# Patient Record
Sex: FEMALE | Race: WHITE | NOT HISPANIC OR LATINO
[De-identification: names, ages, dates, MRNs, and addresses within clinical notes are randomized per-mention and may not be internally consistent; named-entity substitution may affect disease eponyms.]

---

## 2017-10-26 ENCOUNTER — TRANSCRIPTION ENCOUNTER (OUTPATIENT)
Age: 47
End: 2017-10-26

## 2017-10-26 PROBLEM — Z00.00 ENCOUNTER FOR PREVENTIVE HEALTH EXAMINATION: Status: ACTIVE | Noted: 2017-10-26

## 2017-11-07 ENCOUNTER — APPOINTMENT (OUTPATIENT)
Dept: PLASTIC SURGERY | Facility: CLINIC | Age: 47
End: 2017-11-07
Payer: COMMERCIAL

## 2017-11-07 VITALS — HEIGHT: 64 IN | WEIGHT: 157 LBS | BODY MASS INDEX: 26.8 KG/M2

## 2017-11-07 DIAGNOSIS — Z82.49 FAMILY HISTORY OF ISCHEMIC HEART DISEASE AND OTHER DISEASES OF THE CIRCULATORY SYSTEM: ICD-10-CM

## 2017-11-07 DIAGNOSIS — Z83.3 FAMILY HISTORY OF DIABETES MELLITUS: ICD-10-CM

## 2017-11-07 PROCEDURE — 99203 OFFICE O/P NEW LOW 30 MIN: CPT

## 2017-11-07 RX ORDER — LEVOTHYROXINE SODIUM 0.12 MG/1
125 TABLET ORAL DAILY
Refills: 0 | Status: ACTIVE | COMMUNITY

## 2017-11-07 RX ORDER — ATORVASTATIN CALCIUM 80 MG/1
TABLET, FILM COATED ORAL
Refills: 0 | Status: ACTIVE | COMMUNITY

## 2018-02-02 ENCOUNTER — APPOINTMENT (OUTPATIENT)
Dept: PLASTIC SURGERY | Facility: CLINIC | Age: 48
End: 2018-02-02
Payer: COMMERCIAL

## 2018-02-02 PROCEDURE — 11401 EXC TR-EXT B9+MARG 0.6-1 CM: CPT

## 2018-02-02 PROCEDURE — 13120 CMPLX RPR S/A/L 1.1-2.5 CM: CPT | Mod: 59

## 2018-02-14 ENCOUNTER — APPOINTMENT (OUTPATIENT)
Dept: PLASTIC SURGERY | Facility: CLINIC | Age: 48
End: 2018-02-14
Payer: COMMERCIAL

## 2018-02-14 DIAGNOSIS — L98.9 DISORDER OF THE SKIN AND SUBCUTANEOUS TISSUE, UNSPECIFIED: ICD-10-CM

## 2018-02-14 PROCEDURE — 99024 POSTOP FOLLOW-UP VISIT: CPT

## 2018-03-09 ENCOUNTER — EMERGENCY (EMERGENCY)
Facility: HOSPITAL | Age: 48
LOS: 0 days | Discharge: HOME | End: 2018-03-09
Attending: EMERGENCY MEDICINE

## 2018-03-09 VITALS
RESPIRATION RATE: 18 BRPM | TEMPERATURE: 98 F | HEART RATE: 75 BPM | OXYGEN SATURATION: 98 % | DIASTOLIC BLOOD PRESSURE: 65 MMHG | SYSTOLIC BLOOD PRESSURE: 131 MMHG

## 2018-03-09 DIAGNOSIS — Z79.899 OTHER LONG TERM (CURRENT) DRUG THERAPY: ICD-10-CM

## 2018-03-09 DIAGNOSIS — E03.9 HYPOTHYROIDISM, UNSPECIFIED: ICD-10-CM

## 2018-03-09 DIAGNOSIS — R00.2 PALPITATIONS: ICD-10-CM

## 2018-03-09 DIAGNOSIS — E78.00 PURE HYPERCHOLESTEROLEMIA, UNSPECIFIED: ICD-10-CM

## 2018-03-09 DIAGNOSIS — R07.89 OTHER CHEST PAIN: ICD-10-CM

## 2018-03-09 LAB
ALBUMIN SERPL ELPH-MCNC: 4.9 G/DL — SIGNIFICANT CHANGE UP (ref 3–5.5)
ALP SERPL-CCNC: 41 U/L — SIGNIFICANT CHANGE UP (ref 30–115)
ALT FLD-CCNC: 13 U/L — SIGNIFICANT CHANGE UP (ref 0–41)
ANION GAP SERPL CALC-SCNC: 9 MMOL/L — SIGNIFICANT CHANGE UP (ref 7–14)
AST SERPL-CCNC: 22 U/L — SIGNIFICANT CHANGE UP (ref 0–41)
BASOPHILS # BLD AUTO: 0.03 K/UL — SIGNIFICANT CHANGE UP (ref 0–0.2)
BASOPHILS NFR BLD AUTO: 0.3 % — SIGNIFICANT CHANGE UP (ref 0–1)
BILIRUB SERPL-MCNC: 0.9 MG/DL — SIGNIFICANT CHANGE UP (ref 0.2–1.2)
BUN SERPL-MCNC: 10 MG/DL — SIGNIFICANT CHANGE UP (ref 10–20)
CALCIUM SERPL-MCNC: 10.2 MG/DL — HIGH (ref 8.5–10.1)
CHLORIDE SERPL-SCNC: 103 MMOL/L — SIGNIFICANT CHANGE UP (ref 98–110)
CK MB BLD-MCNC: 2 % — SIGNIFICANT CHANGE UP (ref 0–4)
CK MB CFR SERPL CALC: 1 NG/ML — SIGNIFICANT CHANGE UP (ref 0.6–6.3)
CK SERPL-CCNC: 59 U/L — SIGNIFICANT CHANGE UP (ref 0–225)
CO2 SERPL-SCNC: 26 MMOL/L — SIGNIFICANT CHANGE UP (ref 17–32)
CREAT SERPL-MCNC: 0.8 MG/DL — SIGNIFICANT CHANGE UP (ref 0.7–1.5)
D DIMER BLD IA.RAPID-MCNC: 120 NG/ML DDU — SIGNIFICANT CHANGE UP (ref 0–230)
EOSINOPHIL # BLD AUTO: 0.02 K/UL — SIGNIFICANT CHANGE UP (ref 0–0.7)
EOSINOPHIL NFR BLD AUTO: 0.2 % — SIGNIFICANT CHANGE UP (ref 0–8)
GLUCOSE SERPL-MCNC: 82 MG/DL — SIGNIFICANT CHANGE UP (ref 70–110)
HCT VFR BLD CALC: 42 % — SIGNIFICANT CHANGE UP (ref 37–47)
HGB BLD-MCNC: 14.4 G/DL — SIGNIFICANT CHANGE UP (ref 12–16)
IMM GRANULOCYTES NFR BLD AUTO: 0.2 % — SIGNIFICANT CHANGE UP (ref 0.1–0.3)
LYMPHOCYTES # BLD AUTO: 2.48 K/UL — SIGNIFICANT CHANGE UP (ref 1.2–3.4)
LYMPHOCYTES # BLD AUTO: 24.5 % — SIGNIFICANT CHANGE UP (ref 20.5–51.1)
MCHC RBC-ENTMCNC: 29.5 PG — SIGNIFICANT CHANGE UP (ref 27–31)
MCHC RBC-ENTMCNC: 34.3 G/DL — SIGNIFICANT CHANGE UP (ref 32–37)
MCV RBC AUTO: 86.1 FL — SIGNIFICANT CHANGE UP (ref 81–99)
MONOCYTES # BLD AUTO: 0.83 K/UL — HIGH (ref 0.1–0.6)
MONOCYTES NFR BLD AUTO: 8.2 % — SIGNIFICANT CHANGE UP (ref 1.7–9.3)
NEUTROPHILS # BLD AUTO: 6.73 K/UL — HIGH (ref 1.4–6.5)
NEUTROPHILS NFR BLD AUTO: 66.6 % — SIGNIFICANT CHANGE UP (ref 42.2–75.2)
NRBC # BLD: 0 /100 WBCS — SIGNIFICANT CHANGE UP (ref 0–0)
PLATELET # BLD AUTO: 300 K/UL — SIGNIFICANT CHANGE UP (ref 130–400)
POTASSIUM SERPL-MCNC: 4.2 MMOL/L — SIGNIFICANT CHANGE UP (ref 3.5–5)
POTASSIUM SERPL-SCNC: 4.2 MMOL/L — SIGNIFICANT CHANGE UP (ref 3.5–5)
PROT SERPL-MCNC: 7.4 G/DL — SIGNIFICANT CHANGE UP (ref 6–8)
RBC # BLD: 4.88 M/UL — SIGNIFICANT CHANGE UP (ref 4.2–5.4)
RBC # FLD: 12.1 % — SIGNIFICANT CHANGE UP (ref 11.5–14.5)
SODIUM SERPL-SCNC: 138 MMOL/L — SIGNIFICANT CHANGE UP (ref 135–146)
TROPONIN I SERPL-MCNC: <0.02 NG/ML — SIGNIFICANT CHANGE UP (ref 0–0.05)
WBC # BLD: 10.11 K/UL — SIGNIFICANT CHANGE UP (ref 4.8–10.8)
WBC # FLD AUTO: 10.11 K/UL — SIGNIFICANT CHANGE UP (ref 4.8–10.8)

## 2018-03-09 RX ORDER — ATORVASTATIN CALCIUM 80 MG/1
0 TABLET, FILM COATED ORAL
Qty: 0 | Refills: 0 | COMMUNITY

## 2018-03-09 RX ORDER — LEVOTHYROXINE SODIUM 125 MCG
1 TABLET ORAL
Qty: 0 | Refills: 0 | COMMUNITY

## 2018-03-09 RX ORDER — SODIUM CHLORIDE 9 MG/ML
3 INJECTION INTRAMUSCULAR; INTRAVENOUS; SUBCUTANEOUS ONCE
Qty: 0 | Refills: 0 | Status: COMPLETED | OUTPATIENT
Start: 2018-03-09 | End: 2018-03-09

## 2018-03-09 RX ADMIN — SODIUM CHLORIDE 3 MILLILITER(S): 9 INJECTION INTRAMUSCULAR; INTRAVENOUS; SUBCUTANEOUS at 20:30

## 2018-03-09 NOTE — ED ADULT NURSE NOTE - OBJECTIVE STATEMENT
pt. c/o intermittent worsening palpitations, state she was told by nurse today that her pulse was irregular, pt. c/o SOB, pressure in temples and shoulder pain earlier today, which have resolved PTA, states she still feels palpitations on and off, EKG normal sinus, no SOB noted at this time, a&o x3, no distress noted

## 2018-03-09 NOTE — ED ADULT NURSE NOTE - CHPI ED SYMPTOMS NEG
no cough/no diaphoresis/no vomiting/no shortness of breath/no chills/no dizziness/no back pain/no chest pain/no nausea/no syncope/no fever

## 2018-03-09 NOTE — ED PROVIDER NOTE - OBJECTIVE STATEMENT
46 yo F with history of hypothyroidism presents to ED with palpitations today associated with chest and head pressure, chest presure worse on deep inhalation.  Patient reports her heart rate has been erratic. She was noted to be tachycardic at her job, went ot urgent care and was then sent to ED. Patient reports palpitations have improved. + mild chest pressure now. No lightheadedness. 48 yo F with history of hypothyroidism presents to ED with palpitations today associated with chest and head pressure, chest presure worse on deep inhalation.  Patient reports her heart rate has been erratic. She was noted to be tachycardic at her job, went ot urgent care and was then sent to ED. Patient reports palpitations have improved. + mild chest pressure now. No lightheadedness. + family history of blood clot

## 2018-03-09 NOTE — ED PROVIDER NOTE - NS ED ROS FT
Review of Systems    Constitutional: (-) fever  Eyes/ENT: (-) blurry vision, (-) epistaxis  Cardiovascular: + chest pain, (-) syncope  Respiratory: (-) cough, (-) shortness of breath  Gastrointestinal: (-) vomiting, (-) diarrhea  Musculoskeletal: (-) neck pain, (-) back pain, (-) joint pain  Integumentary: (-) rash, (-) edema  Neurological: (-) headache, (-) altered mental status  Psychiatric: (-) hallucinations  Allergic/Immunologic: (-) pruritus

## 2018-03-09 NOTE — ED PROVIDER NOTE - MEDICAL DECISION MAKING DETAILS
Atypical chest pain, re-assuring ECG, - troponin,- D-dimer, patient can follow with cardiologist, understands reasons to return

## 2018-10-24 ENCOUNTER — EMERGENCY (EMERGENCY)
Facility: HOSPITAL | Age: 48
LOS: 0 days | Discharge: HOME | End: 2018-10-24
Attending: EMERGENCY MEDICINE | Admitting: EMERGENCY MEDICINE

## 2018-10-24 VITALS
DIASTOLIC BLOOD PRESSURE: 80 MMHG | SYSTOLIC BLOOD PRESSURE: 131 MMHG | OXYGEN SATURATION: 96 % | HEART RATE: 81 BPM | RESPIRATION RATE: 18 BRPM

## 2018-10-24 VITALS
SYSTOLIC BLOOD PRESSURE: 146 MMHG | WEIGHT: 151.02 LBS | TEMPERATURE: 99 F | HEIGHT: 64 IN | RESPIRATION RATE: 19 BRPM | HEART RATE: 86 BPM | OXYGEN SATURATION: 96 % | DIASTOLIC BLOOD PRESSURE: 74 MMHG

## 2018-10-24 DIAGNOSIS — Z88.0 ALLERGY STATUS TO PENICILLIN: ICD-10-CM

## 2018-10-24 DIAGNOSIS — Z90.710 ACQUIRED ABSENCE OF BOTH CERVIX AND UTERUS: ICD-10-CM

## 2018-10-24 DIAGNOSIS — E78.5 HYPERLIPIDEMIA, UNSPECIFIED: ICD-10-CM

## 2018-10-24 DIAGNOSIS — Z90.711 ACQUIRED ABSENCE OF UTERUS WITH REMAINING CERVICAL STUMP: Chronic | ICD-10-CM

## 2018-10-24 DIAGNOSIS — Z79.2 LONG TERM (CURRENT) USE OF ANTIBIOTICS: ICD-10-CM

## 2018-10-24 DIAGNOSIS — E03.9 HYPOTHYROIDISM, UNSPECIFIED: ICD-10-CM

## 2018-10-24 DIAGNOSIS — R05 COUGH: ICD-10-CM

## 2018-10-24 DIAGNOSIS — R06.1 STRIDOR: ICD-10-CM

## 2018-10-24 DIAGNOSIS — E78.00 PURE HYPERCHOLESTEROLEMIA, UNSPECIFIED: ICD-10-CM

## 2018-10-24 DIAGNOSIS — Z79.899 OTHER LONG TERM (CURRENT) DRUG THERAPY: ICD-10-CM

## 2018-10-24 RX ORDER — DEXAMETHASONE 0.5 MG/5ML
10 ELIXIR ORAL ONCE
Qty: 0 | Refills: 0 | Status: COMPLETED | OUTPATIENT
Start: 2018-10-24 | End: 2018-10-24

## 2018-10-24 RX ORDER — IPRATROPIUM/ALBUTEROL SULFATE 18-103MCG
1 AEROSOL WITH ADAPTER (GRAM) INHALATION ONCE
Qty: 0 | Refills: 0 | Status: COMPLETED | OUTPATIENT
Start: 2018-10-24 | End: 2018-10-24

## 2018-10-24 RX ADMIN — Medication 10 MILLIGRAM(S): at 20:54

## 2018-10-24 RX ADMIN — Medication 1 MILLILITER(S): at 20:57

## 2018-10-24 NOTE — ED PROVIDER NOTE - ATTENDING CONTRIBUTION TO CARE
47 yo female, presents to ed for  suspected stridor. Pt reports has been treated for pneumonia over the past week, was started on clarithromycin and now currently day 3/10 of Levaquin. Pt report fever has improved and cough has improved as well. Comes to ED for evaluation of "sound" upon inspiration. pt describes it as a rattling. She states it is actually improved.  Pt denies fever, chills, SOB, dyspnea, chest pain, drooling. on exam pt well appearing normal speech, no drooling, normal posterior op, s1s2 ctab soft nt/nd. im,pression, suspected stridor not present. xray, neb and re-eval

## 2018-10-24 NOTE — ED PROVIDER NOTE - PHYSICAL EXAMINATION
Physical Exam    Vital Signs: I have reviewed the initial vital signs.  Constitutional: well-nourished, appears stated age, no acute distress  Eyes: Conjunctiva pink, Sclera clear, PERRLA, EOMI.  ENT: OP is clear without exudates, no lymphadenopathy.  Cardiovascular: S1 and S2, regular rate, regular rhythm, well-perfused extremities, radial pulses equal and 2+  Respiratory: unlabored respiratory effort, clear to auscultation bilaterally no wheezing, rales and rhonchi. No stridor to ascultation over trachea.   Gastrointestinal: soft, non-tender abdomen, no pulsatile mass, normal bowl sounds  Musculoskeletal: supple neck, no lower extremity edema, no midline tenderness  Integumentary: warm, dry, no rash  Neurologic: awake, alert, cranial nerves II-XII grossly intact, extremities’ motor and sensory functions grossly intact

## 2018-10-24 NOTE — ED PROVIDER NOTE - NS ED ROS FT
Constitutional: (-) fever, (-) chills  ENT:  (-) sore throat   Cardiovascular: (-) chest pain, (-) syncope  Respiratory: (+) cough, (-) shortness of breath, (-) dyspnea,   Gastrointestinal: (-) vomiting, (-) diarrhea, (-)nausea,   Musculoskeletal: (-) neck pain, (-) back pain, (-) joint pain,   Integumentary: (-) rash,   Neurological: (-) headache,  (-) dizziness, (-) tingling, (-)numbness,  : (-)frequency,, (-)dysuria,

## 2018-10-24 NOTE — ED ADULT NURSE NOTE - NSIMPLEMENTINTERV_GEN_ALL_ED
Implemented All Universal Safety Interventions:  Holden to call system. Call bell, personal items and telephone within reach. Instruct patient to call for assistance. Room bathroom lighting operational. Non-slip footwear when patient is off stretcher. Physically safe environment: no spills, clutter or unnecessary equipment. Stretcher in lowest position, wheels locked, appropriate side rails in place.

## 2018-10-24 NOTE — ED PROVIDER NOTE - OBJECTIVE STATEMENT
49 yo female, PMH of hypothyroidism and HLD, presents to the ED for evaluation of suspected stridor. Pt reports has been treated for pneumonia over the past week, was started on clarithromycin and now currently day 3/10 of Levaquin. Pt report fever has improved and cough has improved as well. Comes to ED for evaluation of "sound" upon inspiration. Pt denies fever, chills, SOB, dyspnea, chest pain, abdominal pain, N/V/D, sore throat, neck pain, back pain, numbness, tingling, HA, or dizziness.

## 2018-10-24 NOTE — ED ADULT TRIAGE NOTE - CHIEF COMPLAINT QUOTE
I was diagnosed with pneumonia and I've changed antibiotics from Biaxin to Levaquin (third day), I don't have a fever, but when I cough it sounds like pop rocks crackling

## 2018-10-25 PROBLEM — E78.00 PURE HYPERCHOLESTEROLEMIA, UNSPECIFIED: Chronic | Status: ACTIVE | Noted: 2018-03-09

## 2018-10-25 PROBLEM — E03.9 HYPOTHYROIDISM, UNSPECIFIED: Chronic | Status: ACTIVE | Noted: 2018-03-09

## 2018-11-07 ENCOUNTER — TRANSCRIPTION ENCOUNTER (OUTPATIENT)
Age: 48
End: 2018-11-07

## 2020-01-22 ENCOUNTER — EMERGENCY (EMERGENCY)
Facility: HOSPITAL | Age: 50
LOS: 0 days | Discharge: HOME | End: 2020-01-22
Attending: EMERGENCY MEDICINE | Admitting: EMERGENCY MEDICINE
Payer: COMMERCIAL

## 2020-01-22 VITALS
HEIGHT: 64 IN | DIASTOLIC BLOOD PRESSURE: 58 MMHG | RESPIRATION RATE: 18 BRPM | TEMPERATURE: 97 F | OXYGEN SATURATION: 99 % | WEIGHT: 141.1 LBS | SYSTOLIC BLOOD PRESSURE: 115 MMHG | HEART RATE: 71 BPM

## 2020-01-22 DIAGNOSIS — Z90.711 ACQUIRED ABSENCE OF UTERUS WITH REMAINING CERVICAL STUMP: Chronic | ICD-10-CM

## 2020-01-22 DIAGNOSIS — R05 COUGH: ICD-10-CM

## 2020-01-22 DIAGNOSIS — J06.9 ACUTE UPPER RESPIRATORY INFECTION, UNSPECIFIED: ICD-10-CM

## 2020-01-22 DIAGNOSIS — Z88.0 ALLERGY STATUS TO PENICILLIN: ICD-10-CM

## 2020-01-22 PROCEDURE — 71046 X-RAY EXAM CHEST 2 VIEWS: CPT | Mod: 26

## 2020-01-22 PROCEDURE — 99283 EMERGENCY DEPT VISIT LOW MDM: CPT

## 2020-01-22 NOTE — ED ADULT TRIAGE NOTE - CHIEF COMPLAINT QUOTE
"I tested negative for the flu on Sunday. I still feel body aches and have a cough with green/yellow sputum"

## 2020-01-22 NOTE — ED PROVIDER NOTE - PATIENT PORTAL LINK FT
You can access the FollowMyHealth Patient Portal offered by [facility] by registering at the following website: [email or web address]. By joining [employer]’s FollowMyHealth portal, you will also be able to view your health information using other applications (apps) compatible with our system.

## 2020-01-22 NOTE — ED PROVIDER NOTE - NSFOLLOWUPINSTRUCTIONS_ED_ALL_ED_FT
-Follow up with your Primary Care Provider in 1-3 days  -Return to ED for worsening symptoms or concerns.    Upper Respiratory Infection, Adult  An upper respiratory infection (URI) is a common viral infection of the nose, throat, and upper air passages that lead to the lungs. The most common type of URI is the common cold. URIs usually get better on their own, without medical treatment.    What are the causes?  A URI is caused by a virus. You may catch a virus by:    Breathing in droplets from an infected person's cough or sneeze.  Touching something that has been exposed to the virus (contaminated) and then touching your mouth, nose, or eyes.    What increases the risk?  You are more likely to get a URI if:    You are very young or very old.  It is carey or winter.  You have close contact with others, such as at a , school, or health care facility.  You smoke.  You have long-term (chronic) heart or lung disease.  You have a weakened disease-fighting (immune) system.  You have nasal allergies or asthma.  You are experiencing a lot of stress.  You work in an area that has poor air circulation.  You have poor nutrition.    What are the signs or symptoms?  A URI usually involves some of the following symptoms:    Runny or stuffy (congested) nose.  Sneezing.  Cough.  Sore throat.  Headache.  Fatigue.  Fever.  Loss of appetite.  Pain in your forehead, behind your eyes, and over your cheekbones (sinus pain).  Muscle aches.  Redness or irritation of the eyes.  Pressure in the ears or face.    How is this diagnosed?  This condition may be diagnosed based on your medical history and symptoms, and a physical exam. Your health care provider may use a cotton swab to take a mucus sample from your nose (nasal swab). This sample can be tested to determine what virus is causing the illness.    How is this treated?  URIs usually get better on their own within 7–10 days. You can take steps at home to relieve your symptoms. Medicines cannot cure URIs, but your health care provider may recommend certain medicines to help relieve symptoms, such as:    Over-the-counter cold medicines.  Cough suppressants. Coughing is a type of defense against infection that helps to clear the respiratory system, so take these medicines only as recommended by your health care provider.  Fever-reducing medicines.    Follow these instructions at home:  Activity     Rest as needed.  If you have a fever, stay home from work or school until your fever is gone or until your health care provider says you are no longer contagious. Your health care provider may have you wear a face mask to prevent your infection from spreading.  Relieving symptoms     Gargle with a salt-water mixture 3–4 times a day or as needed. To make a salt-water mixture, completely dissolve ½–1 tsp of salt in 1 cup of warm water.  Use a cool-mist humidifier to add moisture to the air. This can help you breathe more easily.  Eating and drinking     Drink enough fluid to keep your urine pale yellow.  ImageEat soups and other clear broths.  General instructions     Take over-the-counter and prescription medicines only as told by your health care provider. These include cold medicines, fever reducers, and cough suppressants.  Do not use any products that contain nicotine or tobacco, such as cigarettes and e-cigarettes. If you need help quitting, ask your health care provider.   Stay away from secondhand smoke.  Stay up to date on all immunizations, including the yearly (annual) flu vaccine.  ImageKeep all follow-up visits as told by your health care provider. This is important.  How to prevent the spread of infection to others     ImageURIs can be passed from person to person (are contagious). To prevent the infection from spreading:    Wash your hands often with soap and water. If soap and water are not available, use hand .  Avoid touching your mouth, face, eyes, or nose.  Cough or sneeze into a tissue or your sleeve or elbow instead of into your hand or into the air.    Contact a health care provider if:  You are getting worse instead of better.  You have a fever or chills.  Your mucus is brown or red.  You have yellow or brown discharge coming from your nose.  You have pain in your face, especially when you bend forward.  You have swollen neck glands.  You have pain while swallowing.  You have white areas in the back of your throat.  Get help right away if:  You have shortness of breath that gets worse.  You have severe or persistent:    Headache.  Ear pain.  Sinus pain.  Chest pain.    You have chronic lung disease along with any of the following:    Wheezing.  Prolonged cough.  Coughing up blood.  A change in your usual mucus.    You have a stiff neck.  You have changes in your:    Vision.  Hearing.  Thinking.  Mood.    Summary  An upper respiratory infection (URI) is a common infection of the nose, throat, and upper air passages that lead to the lungs.  A URI is caused by a virus.  URIs usually get better on their own within 7–10 days.  Medicines cannot cure URIs, but your health care provider may recommend certain medicines to help relieve symptoms.

## 2020-01-22 NOTE — ED PROVIDER NOTE - CLINICAL SUMMARY MEDICAL DECISION MAKING FREE TEXT BOX
CXr reviewed and results d/w patient.  Rec supportive care, rec nasal spray.  Follow up with PMD. Pt instructed to return if any worsening symptoms or concerns.  They verbalize understanding.

## 2020-01-22 NOTE — ED PROVIDER NOTE - OBJECTIVE STATEMENT
49 year old female hx HLD hypothyroid presenting with cough x 5 days. Patient seen by her PMD 3 days ago and was tested for flu which was neg. She admits to fevers 3 days ago which resolved. She admits to congestion that she feels has gone down to her chest as she has a worsening cough. no chills, abd or chest pain, n/v/d, dysuria. no recent travel or sick contacts. Patient hospitalzed for PNA in october 2019

## 2020-01-22 NOTE — ED PROVIDER NOTE - CARE PLAN
Principal Discharge DX:	URI (upper respiratory infection) Principal Discharge DX:	URI (upper respiratory infection)  Secondary Diagnosis:	Cough

## 2020-01-22 NOTE — ED PROVIDER NOTE - ATTENDING CONTRIBUTION TO CARE
48 yo F presents with c/o cough x 5 days.  Pt states she initially had chills, fever and body aches which have resolved.  She was seen by PMD 3 days ago and tested negative for the flu.  Cough persists,  with congestion.  On exam pt in NAD AAO x 3, OP clear, no lad, Lungs cta b/l no wrr, no edema, no rash

## 2020-06-26 NOTE — ED PROVIDER NOTE - CARE PLAN
Addended by: RANJITH CHAKRABORTY on: 6/26/2020 11:19 AM     Modules accepted: Orders     Principal Discharge DX:	Cough

## 2021-09-22 NOTE — ED PROVIDER NOTE - NS ED ROS FT
Attempted to contact pt no answer no VM option  
Caller: Glendy Garcia    Relationship: Self    Best call back number: [phone]    Medication needed:   PRO AIR HFA INHALER  TWO PUFFS EVERY 6 HOURS AS NEEDED FOR SHORTNESS OF BREATH AND WHEEZING      When do you need the refill by: ASAP    What additional details did the patient provide when requesting the medication: PATIENT STATED THIS INHALER HELPERS HER AND THE ALBUTEROL ONE DOES NOT. PLEASE ADVISE AND CALL PATIENT [phone]    Does the patient have less than a 3 day supply:  [x] Yes  [] No    What is the patient's preferred pharmacy: [id number] [address] [facility] - [phone] [facility] [phone] FX         
Needs appointment next month  sent  
Rx Refill Note  Requested Prescriptions      No prescriptions requested or ordered in this encounter      Last office visit with prescribing clinician: 5/12/2021      Next office visit with prescribing clinician: Visit date not found            Leidy Paz CMA  09/16/21, 16:12 EDT  
Unable to reach pt by phone no answer no VM option  
Review of Systems         Constitutional: (-) fever (-) chills (-) weakness       EENT:  (-) sore throat (-) congestion       Cardiovascular: (-) chest pain (-) syncope       Respiratory: (+) cough, (-) shortness of breath       Gastrointestinal: (-) abdominal pain (-) vomiting (-) diarrhea (-) nausea (-) constipation       Genitourinary: (-) dysuria (-) frequency (-) hematuria       Musculoskeletal: (-) neck pain (-) back pain (-) joint pain       Integumentary: (-) rash       Neurological: (-) headache (-) altered mental status (-) dizziness (-) paresthesias